# Patient Record
Sex: MALE | Race: WHITE | NOT HISPANIC OR LATINO | URBAN - METROPOLITAN AREA
[De-identification: names, ages, dates, MRNs, and addresses within clinical notes are randomized per-mention and may not be internally consistent; named-entity substitution may affect disease eponyms.]

---

## 2023-06-29 ENCOUNTER — APPOINTMENT (RX ONLY)
Dept: URBAN - METROPOLITAN AREA CLINIC 321 | Facility: CLINIC | Age: 23
Setting detail: DERMATOLOGY
End: 2023-06-29

## 2023-06-29 DIAGNOSIS — L73.8 OTHER SPECIFIED FOLLICULAR DISORDERS: ICD-10-CM

## 2023-06-29 DIAGNOSIS — L85.3 XEROSIS CUTIS: ICD-10-CM

## 2023-06-29 PROCEDURE — 99202 OFFICE O/P NEW SF 15 MIN: CPT

## 2023-06-29 PROCEDURE — ? TREATMENT REGIMEN

## 2023-06-29 PROCEDURE — ? COSMETIC QUOTE

## 2023-06-29 PROCEDURE — ? COUNSELING

## 2023-06-29 PROCEDURE — ? FULL BODY SKIN EXAM - DECLINED

## 2023-06-29 ASSESSMENT — LOCATION ZONE DERM: LOCATION ZONE: FACE

## 2023-06-29 ASSESSMENT — LOCATION SIMPLE DESCRIPTION DERM: LOCATION SIMPLE: LEFT CHEEK

## 2023-06-29 ASSESSMENT — LOCATION DETAILED DESCRIPTION DERM: LOCATION DETAILED: LEFT MEDIAL MALAR CHEEK

## 2023-06-29 NOTE — PROCEDURE: COSMETIC QUOTE
Discount Percentage: 0
Juvederm Price Per Syringe: 500
Xeomin Price Per Unit: 15
Notice: We have created a more complete Cosmetic Quote plan.  The procedure name is also Cosmetic Quote.  Please review the new plan and hide the Cosmetic Quote plan you do not want to use.
Detail Level: Simple